# Patient Record
Sex: FEMALE | Race: WHITE | NOT HISPANIC OR LATINO | Employment: UNEMPLOYED | URBAN - METROPOLITAN AREA
[De-identification: names, ages, dates, MRNs, and addresses within clinical notes are randomized per-mention and may not be internally consistent; named-entity substitution may affect disease eponyms.]

---

## 2018-03-04 ENCOUNTER — APPOINTMENT (OUTPATIENT)
Dept: RADIOLOGY | Facility: MEDICAL CENTER | Age: 58
End: 2018-03-04
Attending: EMERGENCY MEDICINE

## 2018-03-04 ENCOUNTER — HOSPITAL ENCOUNTER (EMERGENCY)
Facility: MEDICAL CENTER | Age: 58
End: 2018-03-04
Attending: EMERGENCY MEDICINE

## 2018-03-04 VITALS
HEART RATE: 92 BPM | TEMPERATURE: 98 F | BODY MASS INDEX: 30.64 KG/M2 | SYSTOLIC BLOOD PRESSURE: 140 MMHG | RESPIRATION RATE: 16 BRPM | HEIGHT: 68 IN | OXYGEN SATURATION: 100 % | WEIGHT: 202.16 LBS | DIASTOLIC BLOOD PRESSURE: 70 MMHG

## 2018-03-04 DIAGNOSIS — S42.222A 2-PART DISPLACED FRACTURE OF SURGICAL NECK OF LEFT HUMERUS, INITIAL ENCOUNTER FOR CLOSED FRACTURE: ICD-10-CM

## 2018-03-04 PROCEDURE — A9270 NON-COVERED ITEM OR SERVICE: HCPCS | Performed by: EMERGENCY MEDICINE

## 2018-03-04 PROCEDURE — 73030 X-RAY EXAM OF SHOULDER: CPT | Mod: LT

## 2018-03-04 PROCEDURE — 99284 EMERGENCY DEPT VISIT MOD MDM: CPT

## 2018-03-04 PROCEDURE — 700102 HCHG RX REV CODE 250 W/ 637 OVERRIDE(OP): Performed by: EMERGENCY MEDICINE

## 2018-03-04 RX ORDER — HYDROCODONE BITARTRATE AND ACETAMINOPHEN 5; 325 MG/1; MG/1
2 TABLET ORAL ONCE
Status: COMPLETED | OUTPATIENT
Start: 2018-03-04 | End: 2018-03-04

## 2018-03-04 RX ORDER — HYDROCODONE BITARTRATE AND ACETAMINOPHEN 5; 325 MG/1; MG/1
1-2 TABLET ORAL EVERY 4 HOURS PRN
Qty: 19 TAB | Refills: 0 | Status: SHIPPED | OUTPATIENT
Start: 2018-03-04 | End: 2018-03-09

## 2018-03-04 RX ADMIN — HYDROCODONE BITARTRATE AND ACETAMINOPHEN 2 TABLET: 5; 325 TABLET ORAL at 22:15

## 2018-03-04 ASSESSMENT — LIFESTYLE VARIABLES: DO YOU DRINK ALCOHOL: NO

## 2018-03-05 NOTE — ED TRIAGE NOTES
Pt had a beer today, slipped on ice, pt states shoulder injury with pain during movement but is still able to move, bruising from inner mid humerus to wrist.

## 2018-03-05 NOTE — ED TRIAGE NOTES
.Naty Gu  .57 y.o.  .  Chief Complaint   Patient presents with   • GLF     pt slipped on ice; denies dizziness/CP prior to fall; -LOC, -blood thinners   • Shoulder Injury     in L arm; pt has bruising up and down arm from fall; denies use of blood thinners; ROM moderately limited; otherwise CMS intact     Pt ambulatory to triage for above complaints; obvious bruising and swelling noted to L arm; denies blood thinner use. -LOC w/ fall. Pt ROM moderately limited in L arm. Protocol ordered.   Pt placed in lobby and advised to notify staff of new or worsening symptoms.

## 2018-03-05 NOTE — ED NOTES
Pt educated on proper splint and immobilizer care, Pt verbalized understanding of splint and care of splint, no questions at this time,

## 2018-03-05 NOTE — ED PROVIDER NOTES
"ED Provider Note    CHIEF COMPLAINT  Chief Complaint   Patient presents with   • GLF     pt slipped on ice; denies dizziness/CP prior to fall; -LOC, -blood thinners   • Shoulder Injury     in L arm; pt has bruising up and down arm from fall; denies use of blood thinners; ROM moderately limited; otherwise CMS intact       [facility]  Naty Gu is a 57 y.o. female who presents here for evaluation of left shoulder and arm pain and swelling. Patient states she slipped on some ice while walking this afternoon, landing onto her left upper arm. The patient states that she developed significant bruising and pain to the area. She states it she continued to have significant pain through the day today, and due to ongoing pain with any attempted range of motion, the patient decided to present here for evaluation. Notably, with the fall, the patient denies impacting her scalp against the ground, or any additional areas of pain currently.    REVIEW OF SYSTEMS  See HPI for further details. All other systems are negative.     PAST MEDICAL HISTORY       SOCIAL HISTORY  Social History     Social History Main Topics   • Smoking status: Never Smoker   • Smokeless tobacco: Never Used   • Alcohol use Yes      Comment: occ hasd a beer today   • Drug use: Unknown   • Sexual activity: Not on file       SURGICAL HISTORY  patient denies any surgical history    CURRENT MEDICATIONS  Home Medications     Reviewed by Katerin Gomez R.N. (Registered Nurse) on 03/04/18 at 2130  Med List Status: Not Addressed   Medication Last Dose Status        Patient Go Taking any Medications                       ALLERGIES  No Known Allergies    PHYSICAL EXAM  VITAL SIGNS: /83   Pulse 92   Temp 36.7 °C (98 °F)   Resp 14   Ht 1.727 m (5' 8\")   Wt 91.7 kg (202 lb 2.6 oz)   SpO2 100%   BMI 30.74 kg/m²    Pulse ox interpretation: I interpret this pulse ox as normal.  Constitutional: Alert in no apparent distress.  HENT: Normocephalic, Atraumatic, " Bilateral external ears normal. Nose normal.   Eyes: Pupils are equal and reactive. Conjunctiva normal, non-icteric.   Heart: Regular rate and rythm.  Lungs: No audible wheezing, no increased work of breathing, no accessory muscle use.  Abdomen: Soft, non-distended, non-tender   Skin: Warm, Dry, No erythema, No rash.   Extremities: Left upper arm with diffuse bruising, significant tenderness in the proximal 3rd of the humerus, and slight gross deformity.  Neurologic: Alert, Grossly non-focal.   Psychiatric: Affect normal, Judgment normal, Mood normal, appears alert and not intoxicated.     DX-SHOULDER 2+ LEFT   Final Result         1.  Left humeral neck fracture.          COURSE & MEDICAL DECISION MAKING  Pertinent Labs & Imaging studies reviewed. (See chart for details)    Patient presenting here for evaluation of left upper arm pain, swelling, bruising, and deformity. On examination, given the bruising and tenderness, differential included fracture versus significant contusion. X-ray here shows evidence of humeral neck fracture. Patient was placed in a coaptation splint, and will be placed in a shoulder immobilizer. She'll be discharged to follow-up with orthopedic surgery. She was given Norco for pain control on an outpatient basis, and was consented prior to.    The patient will not drink alcohol nor drive with prescribed medications. The patient will return for worsening symptoms and is stable at the time of discharge. The patient verbalizes understanding and will comply.    The patient is referred to a primary physician for blood pressure management, diabetic screening, and for all other preventative health concerns, should they be present.    FINAL IMPRESSION  1. Left humeral neck fracture  2.   3.         Electronically signed by: Anthony Mejia, 3/4/2018 10:15 PM      This record was made with a voice recognition software. I have tried to correct any grammar, spelling or context errors to the best of my  ability, but errors may still remain. Interpretation of this chart should be taken in this context.

## 2018-03-05 NOTE — DISCHARGE INSTRUCTIONS
Extremity Fracture  Broken bones (fractures) take several weeks to months to heal depending on the bone involved. The broken ends must be lined up correctly and kept in position for proper healing. Do not remove the splint, immobilizer, or cast that has been applied to treat your injury. This is the most important part of your treatment. Other measures to treat fractures include:  · Keeping the injured limb at rest and elevated above your heart as recommended by your caregiver. This will help reduce pain and swelling.   · Ice packs can be applied to your fracture site for 20-30 minutes every 3-4 hours over the next 2-3 days.   · Pain medicine may be prescribed in the first days after a fracture.   SEEK IMMEDIATE MEDICAL CARE IF:  · You develop increasing pain or pressure in the injured limb, or if it becomes cold, numb, or pale.   · There is increasing pain with motion of your fingers or toes.   Document Released: 01/25/2006 Document Revised: 03/11/2013 Document Reviewed: 04/07/2010  [employer]® Patient Information ©2013 [employer].